# Patient Record
Sex: FEMALE | Race: WHITE | NOT HISPANIC OR LATINO | Employment: OTHER | URBAN - NONMETROPOLITAN AREA
[De-identification: names, ages, dates, MRNs, and addresses within clinical notes are randomized per-mention and may not be internally consistent; named-entity substitution may affect disease eponyms.]

---

## 2023-12-15 PROBLEM — R42 LIGHTHEADED: Status: ACTIVE | Noted: 2023-12-15

## 2023-12-15 PROBLEM — E66.3 OVERWEIGHT WITH BODY MASS INDEX (BMI) OF 29 TO 29.9 IN ADULT: Status: ACTIVE | Noted: 2023-12-15

## 2023-12-15 PROBLEM — R55 SYNCOPE AND COLLAPSE: Status: ACTIVE | Noted: 2023-12-15

## 2023-12-15 RX ORDER — LEVOTHYROXINE SODIUM 125 UG/1
125 TABLET ORAL
COMMUNITY
End: 2023-12-21 | Stop reason: DRUGHIGH

## 2023-12-15 RX ORDER — CYANOCOBALAMIN 1000 UG/ML
1000 INJECTION, SOLUTION INTRAMUSCULAR; SUBCUTANEOUS
COMMUNITY

## 2023-12-15 RX ORDER — SUMATRIPTAN SUCCINATE 100 MG/1
100 TABLET ORAL ONCE AS NEEDED
COMMUNITY

## 2023-12-21 ENCOUNTER — OFFICE VISIT (OUTPATIENT)
Dept: CARDIOLOGY | Facility: CLINIC | Age: 73
End: 2023-12-21
Payer: COMMERCIAL

## 2023-12-21 VITALS
WEIGHT: 185 LBS | HEART RATE: 64 BPM | BODY MASS INDEX: 29.73 KG/M2 | HEIGHT: 66 IN | DIASTOLIC BLOOD PRESSURE: 76 MMHG | SYSTOLIC BLOOD PRESSURE: 116 MMHG

## 2023-12-21 DIAGNOSIS — R42 LIGHTHEADED: ICD-10-CM

## 2023-12-21 DIAGNOSIS — R55 NEUROCARDIOGENIC SYNCOPE: ICD-10-CM

## 2023-12-21 DIAGNOSIS — E66.3 OVERWEIGHT WITH BODY MASS INDEX (BMI) OF 29 TO 29.9 IN ADULT: ICD-10-CM

## 2023-12-21 PROCEDURE — 3008F BODY MASS INDEX DOCD: CPT | Performed by: INTERNAL MEDICINE

## 2023-12-21 PROCEDURE — 99213 OFFICE O/P EST LOW 20 MIN: CPT | Performed by: INTERNAL MEDICINE

## 2023-12-21 PROCEDURE — 1159F MED LIST DOCD IN RCRD: CPT | Performed by: INTERNAL MEDICINE

## 2023-12-21 RX ORDER — FERROUS SULFATE 325(65) MG
65 TABLET, DELAYED RELEASE (ENTERIC COATED) ORAL
COMMUNITY

## 2023-12-21 RX ORDER — PREGABALIN 50 MG/1
3 CAPSULE ORAL DAILY
COMMUNITY

## 2023-12-21 RX ORDER — LEVOTHYROXINE SODIUM 112 UG/1
1 CAPSULE ORAL
COMMUNITY

## 2023-12-21 RX ORDER — CHOLECALCIFEROL (VITAMIN D3) 50 MCG
50 TABLET ORAL DAILY
COMMUNITY

## 2023-12-21 RX ORDER — TIZANIDINE HYDROCHLORIDE 4 MG/1
4 CAPSULE, GELATIN COATED ORAL DAILY
COMMUNITY

## 2023-12-21 RX ORDER — CETIRIZINE HYDROCHLORIDE 10 MG/1
10 TABLET ORAL DAILY
COMMUNITY

## 2023-12-21 NOTE — PROGRESS NOTES
"Subjective   Alyx Talamantes is a 73 y.o. female       Chief Complaint    Follow-up          HPI   Patient is in the office for follow-up for syncope.  She had tilt table test which was positive for neurocardiogenic syncope.  Conservative measures have been in place and no medications were prescribed.  She has not had any events since September.  Reiterated the nature of the disease and the conservative management we have in place.    Assessment/recommendations:     1-neurocardiogenic confirmed by tilt table testing.  No events since her testing few months ago.  Emphasized the need for maintaining adequate hydration and high salt intake.  She will follow-up with me on as-needed basis..  2-overweight, patient maintains active lifestyle and rides her E bike more than 25 miles in 1 session. Further weight loss is recommended   Review of Systems   All other systems reviewed and are negative.         Visit Vitals  /76 (BP Location: Right arm, Patient Position: Sitting)   Pulse 64   Ht 1.676 m (5' 6\")   Wt 83.9 kg (185 lb)   BMI 29.86 kg/m²   Smoking Status Never   BSA 1.98 m²        Objective   Physical Exam  Constitutional:       Appearance: Normal appearance. She is normal weight.   HENT:      Nose: Nose normal.   Neck:      Vascular: No carotid bruit.   Cardiovascular:      Rate and Rhythm: Normal rate.      Pulses: Normal pulses.      Heart sounds: Normal heart sounds.   Pulmonary:      Effort: Pulmonary effort is normal.   Abdominal:      General: Bowel sounds are normal.      Palpations: Abdomen is soft.   Genitourinary:     Rectum: Normal.   Musculoskeletal:         General: Normal range of motion.      Cervical back: Normal range of motion.      Right lower leg: No edema.      Left lower leg: No edema.   Skin:     General: Skin is warm and dry.   Neurological:      General: No focal deficit present.      Mental Status: She is alert.   Psychiatric:         Mood and Affect: Mood normal.         Behavior: " Behavior normal.         Thought Content: Thought content normal.         Judgment: Judgment normal.         Current Medications    Current Outpatient Medications:     cetirizine (ZyrTEC) 10 mg tablet, Take 1 tablet (10 mg) by mouth once daily., Disp: , Rfl:     cholecalciferol (Vitamin D3) 50 MCG (2000 UT) tablet, Take 1 tablet (50 mcg) by mouth once daily., Disp: , Rfl:     cyanocobalamin (Vitamin B-12) 1,000 mcg/mL injection, Inject 1 mL (1,000 mcg) into the muscle every 14 (fourteen) days., Disp: , Rfl:     ferrous sulfate 325 (65 Fe) MG EC tablet, Take 65 mg by mouth once daily with breakfast. Do not crush, chew, or split., Disp: , Rfl:     levothyroxine (Tirosint) 112 mcg capsule, Take 1 capsule (112 mcg) by mouth once daily in the morning. Take before meals., Disp: , Rfl:     pregabalin (Lyrica) 50 mg capsule, Take 3 capsules (150 mg) by mouth once daily. Take one capsule by mouth every morning and two capsules at bedtime, Disp: , Rfl:     SUMAtriptan (Imitrex) 100 mg tablet, Take 1 tablet (100 mg) by mouth 1 time if needed for migraine. Take one tablet by mouth at onset of migraine headache, may repeat in 2 hours if needed, Disp: , Rfl:     tiZANidine (Zanaflex) 4 mg capsule, Take 1 capsule (4 mg) by mouth early in the morning.., Disp: , Rfl:                      Assessment/Plan   1. Neurocardiogenic syncope  Follow Up In Cardiology      2. Lightheaded        3. Overweight with body mass index (BMI) of 29 to 29.9 in adult

## 2024-10-07 ENCOUNTER — TELEPHONE (OUTPATIENT)
Dept: CARDIOLOGY | Facility: CLINIC | Age: 74
End: 2024-10-07
Payer: COMMERCIAL

## 2024-10-07 NOTE — TELEPHONE ENCOUNTER
pt cancelled appt. states she is doing well and do not need appt    Imiquimod Counseling:  I discussed with the patient the risks of imiquimod including but not limited to erythema, scaling, itching, weeping, crusting, and pain.  Patient understands that the inflammatory response to imiquimod is variable from person to person and was educated regarded proper titration schedule.  If flu-like symptoms develop, patient knows to discontinue the medication and contact us.

## 2024-12-03 ENCOUNTER — APPOINTMENT (OUTPATIENT)
Dept: CARDIOLOGY | Facility: CLINIC | Age: 74
End: 2024-12-03
Payer: COMMERCIAL